# Patient Record
Sex: FEMALE | Race: WHITE | Employment: OTHER | URBAN - METROPOLITAN AREA
[De-identification: names, ages, dates, MRNs, and addresses within clinical notes are randomized per-mention and may not be internally consistent; named-entity substitution may affect disease eponyms.]

---

## 2023-11-05 ENCOUNTER — HOSPITAL ENCOUNTER (OUTPATIENT)
Dept: MRI IMAGING | Facility: HOSPITAL | Age: 63
Discharge: HOME | End: 2023-11-05
Attending: FAMILY MEDICINE
Payer: MEDICARE

## 2023-11-05 ENCOUNTER — HOSPITAL ENCOUNTER (OUTPATIENT)
Dept: MRI IMAGING | Facility: HOSPITAL | Age: 63
End: 2023-11-05
Attending: FAMILY MEDICINE
Payer: MEDICARE

## 2023-11-05 DIAGNOSIS — Z87.898 HISTORY OF SEIZURE: ICD-10-CM

## 2023-11-05 DIAGNOSIS — R42 DIZZINESS AND GIDDINESS: ICD-10-CM

## 2023-11-05 PROCEDURE — 70553 MRI BRAIN STEM W/O & W/DYE: CPT | Performed by: FAMILY MEDICINE

## 2023-11-05 PROCEDURE — A9575 INJ GADOTERATE MEGLUMI 0.1ML: HCPCS | Performed by: FAMILY MEDICINE

## 2023-11-05 RX ORDER — GADOTERATE MEGLUMINE 376.9 MG/ML
20 INJECTION INTRAVENOUS
Status: COMPLETED | OUTPATIENT
Start: 2023-11-05 | End: 2023-11-05

## 2023-11-05 RX ADMIN — GADOTERATE MEGLUMINE 18 ML: 376.9 INJECTION INTRAVENOUS at 09:09:00

## 2023-11-07 ENCOUNTER — NURSE ONLY (OUTPATIENT)
Dept: ELECTROPHYSIOLOGY | Facility: HOSPITAL | Age: 63
End: 2023-11-07
Attending: FAMILY MEDICINE
Payer: MEDICARE

## 2023-11-07 DIAGNOSIS — R42 DIZZINESS AND GIDDINESS: ICD-10-CM

## 2023-11-07 DIAGNOSIS — Z87.898 HISTORY OF SEIZURE: ICD-10-CM

## 2023-11-07 PROCEDURE — 95816 EEG AWAKE AND DROWSY: CPT | Performed by: OTHER

## 2023-11-08 NOTE — PROCEDURES
ELECTROENCEPHALOGRAM REPORT      Patient Name: Lavinia Boeck   : 1960    Date of Test: 2023   History: 58year old female with history of seizures, with instances of dizziness    TECHNICAL ASPECTS OF EEG RECORDING:  This is a scalp EEG monitoring study. Scalp electrodes were positioned according to the 10-20 International system of electrode placement. EEG data were recorded continuously and digitally stored, and this is a routine study. No sedation was given. EEG data were reviewed using bipolar and referential montages. Video recording is also present. DESCRIPTION OF EEG FINDINGS:  BACKGROUND ACTIVITY: The background rhythm consists of well organized, medium amplitude 10 Hertz waves, which are symmetrical. They do attenuate with eye opening. INTERICTAL EPILEPTIFORM ACTIVITY: None  ICTAL ACTIVITY: None  ACTIVATION PROCEDURES: Photic stimulation produced no epileptiform activity. SLEEP: Sleep was not achieved    IMPRESSION:  This is a normal, awake and drowsy electroencephalogram.  Sleep was not achieved. There is no focal abnormality or epileptiform activity seen in this record.       Eugenio Costa DO  Neuromuscular and General Neurology  [facility]

## 2024-01-06 ENCOUNTER — APPOINTMENT (OUTPATIENT)
Dept: GENERAL RADIOLOGY | Facility: HOSPITAL | Age: 64
End: 2024-01-06
Attending: EMERGENCY MEDICINE
Payer: MEDICARE

## 2024-01-06 ENCOUNTER — HOSPITAL ENCOUNTER (EMERGENCY)
Facility: HOSPITAL | Age: 64
Discharge: HOME | End: 2024-01-06
Attending: EMERGENCY MEDICINE
Payer: MEDICARE

## 2024-01-06 ENCOUNTER — APPOINTMENT (OUTPATIENT)
Dept: MRI IMAGING | Facility: HOSPITAL | Age: 64
End: 2024-01-06
Attending: EMERGENCY MEDICINE
Payer: MEDICARE

## 2024-01-06 VITALS
WEIGHT: 190 LBS | DIASTOLIC BLOOD PRESSURE: 71 MMHG | SYSTOLIC BLOOD PRESSURE: 122 MMHG | TEMPERATURE: 98 F | RESPIRATION RATE: 12 BRPM | BODY MASS INDEX: 33.66 KG/M2 | OXYGEN SATURATION: 100 % | HEART RATE: 69 BPM | HEIGHT: 63 IN

## 2024-01-06 DIAGNOSIS — G40.909 SEIZURE DISORDER (HCC): Primary | ICD-10-CM

## 2024-01-06 DIAGNOSIS — R56.9 SEIZURE (HCC): ICD-10-CM

## 2024-01-06 LAB
ALBUMIN SERPL-MCNC: 3.8 G/DL (ref 3.4–5)
ALBUMIN/GLOB SERPL: 1 {RATIO} (ref 1–2)
ALP LIVER SERPL-CCNC: 105 U/L
ALT SERPL-CCNC: 15 U/L
ANION GAP SERPL CALC-SCNC: 7 MMOL/L (ref 0–18)
AST SERPL-CCNC: 9 U/L (ref 15–37)
ATRIAL RATE: 78 BPM
BASOPHILS # BLD AUTO: 0.05 X10(3) UL (ref 0–0.2)
BASOPHILS NFR BLD AUTO: 0.8 %
BILIRUB SERPL-MCNC: 0.3 MG/DL (ref 0.1–2)
BUN BLD-MCNC: 12 MG/DL (ref 9–23)
CALCIUM BLD-MCNC: 8.8 MG/DL (ref 8.5–10.1)
CHLORIDE SERPL-SCNC: 111 MMOL/L (ref 98–112)
CO2 SERPL-SCNC: 23 MMOL/L (ref 21–32)
CREAT BLD-MCNC: 1.25 MG/DL
EGFRCR SERPLBLD CKD-EPI 2021: 48 ML/MIN/1.73M2 (ref 60–?)
EOSINOPHIL # BLD AUTO: 0.01 X10(3) UL (ref 0–0.7)
EOSINOPHIL NFR BLD AUTO: 0.2 %
ERYTHROCYTE [DISTWIDTH] IN BLOOD BY AUTOMATED COUNT: 12.2 %
GLOBULIN PLAS-MCNC: 3.8 G/DL (ref 2.8–4.4)
GLUCOSE BLD-MCNC: 143 MG/DL (ref 70–99)
HCT VFR BLD AUTO: 43.1 %
HGB BLD-MCNC: 15 G/DL
IMM GRANULOCYTES # BLD AUTO: 0.01 X10(3) UL (ref 0–1)
IMM GRANULOCYTES NFR BLD: 0.2 %
LYMPHOCYTES # BLD AUTO: 1.89 X10(3) UL (ref 1–4)
LYMPHOCYTES NFR BLD AUTO: 29.4 %
MCH RBC QN AUTO: 28.7 PG (ref 26–34)
MCHC RBC AUTO-ENTMCNC: 34.8 G/DL (ref 31–37)
MCV RBC AUTO: 82.4 FL
MONOCYTES # BLD AUTO: 0.39 X10(3) UL (ref 0.1–1)
MONOCYTES NFR BLD AUTO: 6.1 %
NEUTROPHILS # BLD AUTO: 4.07 X10 (3) UL (ref 1.5–7.7)
NEUTROPHILS # BLD AUTO: 4.07 X10(3) UL (ref 1.5–7.7)
NEUTROPHILS NFR BLD AUTO: 63.3 %
OSMOLALITY SERPL CALC.SUM OF ELEC: 294 MOSM/KG (ref 275–295)
P AXIS: 50 DEGREES
P-R INTERVAL: 162 MS
PLATELET # BLD AUTO: 433 10(3)UL (ref 150–450)
POTASSIUM SERPL-SCNC: 4 MMOL/L (ref 3.5–5.1)
PROT SERPL-MCNC: 7.6 G/DL (ref 6.4–8.2)
Q-T INTERVAL: 400 MS
QRS DURATION: 100 MS
QTC CALCULATION (BEZET): 456 MS
R AXIS: -2 DEGREES
RBC # BLD AUTO: 5.23 X10(6)UL
SODIUM SERPL-SCNC: 141 MMOL/L (ref 136–145)
T AXIS: 39 DEGREES
TROPONIN I SERPL HS-MCNC: 5 NG/L
VENTRICULAR RATE: 78 BPM
WBC # BLD AUTO: 6.4 X10(3) UL (ref 4–11)

## 2024-01-06 PROCEDURE — 85025 COMPLETE CBC W/AUTO DIFF WBC: CPT | Performed by: EMERGENCY MEDICINE

## 2024-01-06 PROCEDURE — 93005 ELECTROCARDIOGRAM TRACING: CPT

## 2024-01-06 PROCEDURE — 99285 EMERGENCY DEPT VISIT HI MDM: CPT

## 2024-01-06 PROCEDURE — 80053 COMPREHEN METABOLIC PANEL: CPT | Performed by: EMERGENCY MEDICINE

## 2024-01-06 PROCEDURE — 70551 MRI BRAIN STEM W/O DYE: CPT | Performed by: EMERGENCY MEDICINE

## 2024-01-06 PROCEDURE — 96360 HYDRATION IV INFUSION INIT: CPT

## 2024-01-06 PROCEDURE — 84484 ASSAY OF TROPONIN QUANT: CPT | Performed by: EMERGENCY MEDICINE

## 2024-01-06 PROCEDURE — 71045 X-RAY EXAM CHEST 1 VIEW: CPT | Performed by: EMERGENCY MEDICINE

## 2024-01-06 RX ORDER — TOPIRAMATE 50 MG/1
50 TABLET, FILM COATED ORAL NIGHTLY
COMMUNITY
Start: 2023-10-27

## 2024-01-06 RX ORDER — MECLIZINE HYDROCHLORIDE 25 MG/1
25 TABLET ORAL ONCE
Status: COMPLETED | OUTPATIENT
Start: 2024-01-06 | End: 2024-01-06

## 2024-01-06 RX ORDER — CLONAZEPAM 0.5 MG/1
0.5 TABLET ORAL DAILY
Qty: 30 TABLET | Refills: 0 | Status: SHIPPED | OUTPATIENT
Start: 2024-01-06

## 2024-01-06 RX ORDER — AMOXICILLIN 500 MG/1
500 TABLET, FILM COATED ORAL 3 TIMES DAILY
COMMUNITY
Start: 2023-09-13

## 2024-01-06 NOTE — ED QUICK NOTES
SPOKE WITH MRI, ETA: 1430.   Alert and oriented, no focal deficits, no motor or sensory deficits. 5/5 strength in UE/LE b/l. CN II-XII intact.

## 2024-01-06 NOTE — DISCHARGE INSTRUCTIONS
Increase your total dose to 500 mg you can take an extra half a tablet during the evening.  Follow-up with your primary MD, neurologist take 1 baby aspirin daily.  With medications or with your seizure until seen by neurologist.    You were seen in the emergency room in a limited time.  There is a possibility that although we do not see any acute process at this present time that things can change with time.  Is therefore imperative that you follow-up with primary care physician for close follow-up.  If there is any significant progression of your pain  or other symptoms you to return immediately to the emergency room.

## 2024-01-06 NOTE — ED QUICK NOTES
Assumed care for patient. Patient returned from MRI and placed on cardiac monitoring. Pure wick is intact. Patient is calm and in no distress

## 2024-01-06 NOTE — ED INITIAL ASSESSMENT (HPI)
Hx of seizures. Pt is currently A/Ox4 states she is currently having an ongoing seizure for the past 30minutes. Pt reports arms and legs feel weak and spasming. Speech clear, no facial droop.

## 2024-01-06 NOTE — ED PROVIDER NOTES
Patient Seen in: [facility] Emergency Department      History     Chief Complaint   Patient presents with    Seizure Disorder     Stated Complaint: Seizures    Subjective:   HPI    This is a 63-year-old female who has had a previous history of grand mal seizures.  P.  In 2009 she had 3 grand mal seizures and since then she said she has not had any grand mal seizures.  She has these episodes where she feels like she has flailing of her right leg and then gets weak usually last only a few seconds to minutes.  She has seen a neurologist before.  Has been on Lamictal 400 mg daily.  The patient states that she has not missed any doses.  Today she was laying on the couch and then she stated that her right leg and her left leg was flailing.  And moving in different directions.  Although she was awake during the event.  She never passed out.  She states she has had a little bit of hard time peaking sometimes with these events.  She states that he saw a neurologist before for this.  The patient states that she is never passes out or loses consciousness.  But she had a hard time getting up and getting walking around because she felt unsteady on her feet.  She is does have a history of dementia, seizure disorder.  She is on Lamictal, Effexor, Synthroid,.  Present she has no numbness or weakness regarding the nurses had to have a hard time getting her up.  Out of the wheelchair.  Presently has no complaints of headache trouble speaking, numbness or weakness.  Objective:   Past Medical History:   Diagnosis Date    Dementia (HCC)     Depression     Endocrine disorder     enlarged thyroid - removed 1990'    Seizure disorder (HCC)     Seizure disorder, grand mal (HCC)     none for 20 years, then 3 grandmal in 2009    Sleep apnea     Unspecified disorder of thyroid               Past Surgical History:   Procedure Laterality Date    KNEE ARTHROSCOPY      THYROIDECTOMY                  Social History     Socioeconomic History     Marital status:    Tobacco Use    Smoking status: Former     Packs/day: 0.75     Years: 10.00     Additional pack years: 0.00     Total pack years: 7.50     Types: Cigarettes     Quit date: 2005     Years since quittin.0    Smokeless tobacco: Never   Substance and Sexual Activity    Alcohol use: No    Drug use: No              Review of Systems    Positive for stated complaint: Seizures  Other systems are as noted in HPI.  Constitutional and vital signs reviewed.      All other systems reviewed and negative except as noted above.    Physical Exam     ED Triage Vitals [24 1236]   BP (!) 142/99   Pulse 87   Resp 18   Temp 98.2 °F (36.8 °C)   Temp src Temporal   SpO2 97 %   O2 Device None (Room air)       Current:/88   Pulse 70   Temp 98.2 °F (36.8 °C) (Temporal)   Resp 12   Ht 160 cm (5' 3\")   Wt 86.2 kg   SpO2 100%   BMI 33.66 kg/m²         Physical Exam  General: .  Female no respiratory distress she is here with her .   The patient is in no respiratory distress. The patient is not septic or toxic    HEENT: Atraumatic, conjunctiva are not pale.  There is no icterus.  Oral mucosa Is wet. The neck is supple. There is no meningismus.  There is no facial asymmetry.    LUNGS: Clear to auscultation, there is no wheezing or retraction.  No crackles.    CV: Cardiovascular is regular without murmurs or rubs.    ABD: The abdomen is soft nondistended nontender.  There is no rebound.  There is no guarding.  Bowel sounds are present.    EXT: There is good pulses bilaterally.  There is no calf tenderness.  There is no rash noted.  There is no edema    NEURO: Alert and oriented x3.      Cranial nerves are grossly intact.      Pupils are equal and reactive to light     Extraocular muscles are intact.      Muscle strength is 5 out of 5.    Sensory exam is grossly normal bilaterally upper and lower extremity    There is no pronator drift.    There is normal finger to nose  bilaterally.             ED Course     Labs Reviewed   COMP METABOLIC PANEL (14) - Abnormal; Notable for the following components:       Result Value    Glucose 143 (*)     Creatinine 1.25 (*)     eGFR-Cr 48 (*)     AST 9 (*)     All other components within normal limits   TROPONIN I HIGH SENSITIVITY - Normal   CBC WITH DIFFERENTIAL WITH PLATELET    Narrative:     The following orders were created for panel order CBC With Differential With Platelet.  Procedure                               Abnormality         Status                     ---------                               -----------         ------                     CBC W/ DIFFERENTIAL[[id number]]                              Final result                 Please view results for these tests on the individual orders.   RAINBOW DRAW LAVENDER   RAINBOW DRAW LIGHT GREEN   RAINBOW DRAW BLUE   RAINBOW DRAW GOLD   CBC W/ DIFFERENTIAL               The patient was placed on monitors, IV was started, blood was drawn.     Patient's NIH score is 0 at this present time she does not meet criteria for tPA neurologically she looks well at this present time.  But her story is concerning for not a grand mal seizure but some kind of movement disorder but the fact that she had some trouble speaking and also difficulty walking concerning for possible TIA, CVA.  Will do an MRI DWI.  To rule out any acute stroke, electrolyte imbalance.         MDM      The EKG shows normal sinus rhythm.  There is no acute ST elevations or ischemic findings.  The rest of the EKG including rate rhythm axis and intervals I agree with the EKG report . The rate is 78 there is findings of an old inferior infarct possible nonspecific ST changes.  When compared to an old EKG there is no significant changes incomplete right bundle branch was seen previously.          I personally reviewed the radiographs and my individual interpretation shows    No obvious intracranial mass or bleed.    Also reviewed official  report and it shows      MRI STROKE BRAIN DWI ONLY(NO IV)(CPT=70551)    Result Date: 1/6/2024  PROCEDURE:  MRI STROKE BRAIN DWI ONLY(NO IV)(CPT=70551)  COMPARISON:  [facility] , MR, MRI BRAIN (W+WO) (CPT=70553), 11/05/2023, 8:21 AM.  [facility] , MR, MRI BRAIN(W+WO)/MRA BRAIN (CPT=70553/80303), 1/12/2016, 6:17 PM.  INDICATIONS:  Seizures  TECHNIQUE:  MRI of the brain was performed without intravenous gadolinium contrast using only diffusion image sequences in the axial plane. All measurements obtained in this exam were performed using NASCET criteria.  PATIENT STATED HISTORY: (As transcribed by Technologist)  Patient has history of seizures.    FINDINGS: MRI BRAIN No evidence for acute infarct.            CONCLUSION:  Limited MR imaging.  No bright diffusion signal to suggest acute infarct.   LOCATION:  [facility]   Dictated by (CST): Annie Payton MD on 1/06/2024 at 3:25 PM     Finalized by (CST): Annie Payton MD on 1/06/2024 at 3:26 PM       XR CHEST AP PORTABLE  (CPT=71045)    Result Date: 1/6/2024  PROCEDURE:  XR CHEST AP PORTABLE  (CPT=71045)  TECHNIQUE:  AP chest radiograph was obtained.  COMPARISON:  [facility] , XR, XR CHEST AP PORTABLE  (CPT=71010), 1/12/2016, 1:30 PM.  [facility] , XR, RIBS WITH CXR (3 VIEWS), LEFT, 9/15/2012, 10:37 AM.  INDICATIONS:  Seizures  PATIENT STATED HISTORY: (As transcribed by Technologist)  Patient stated she had a siezure that lasted 30 minutes this morning.                 CONCLUSION:  Normal examination.    LOCATION:  [facility]      Dictated by (CST): Rui Griffith MD on 1/06/2024 at 1:44 PM     Finalized by (CST): Rui Griffith MD on 1/06/2024 at 1:45 PM             The patient's CBC was grossly negative  Comprehensive troponin was grossly negative.  I did go back and reexamined the patient she has a normal gait.  She has no focal findings.  Neurologically intact.  He has a normal gait no focal findings.  I talked to her at length it me it sounded like she is been on 400 mg of  Lamictal.  I did I did talk to her pharmacist who states that she can go up and to 500 mg.  I discussed that take an extra half a tablet of the 200 mg.  I have also she did state that she was on Klonopin but she is off of it I discussed we can give her a short course of Klonopin she is got a scheduled appointment to see the neurologist later this month.  I discussed with her it does sound to be a partial seizure but I discussed with her that she is just to follow-up with her doctor she is neurologically intact with NIH score of 0 I recommend also baby aspirin.    I discussed with the patient that were seeing them  in a short period of time.  I discussed with them that there is always a possibility that things can change and a need reevaluation with their primary care physician as soon as possible.  I've also discussed with them that if the pain gets worse to return to the emergency room immediately.        This note was prepared using Dragon Medical voice recognition dictation software. As a result errors may occur. When identified these errors have been corrected. While every attempt is made to correct errors during dictation discrepancies may still exist.  If there is any questions contact the dictating provider for further clarification    Medical Decision Making      Disposition and Plan     Clinical Impression:  1. Seizure disorder (HCC)    2. Seizure (HCC)         Disposition:  Discharge  1/6/2024  3:45 pm    Follow-up:  Madi Luz MD  [address] [place] [id number]  [phone]    Follow up in 2 day(s)      Umu Rowell MD  [address]   [facility] [address]  [phone]    Follow up in 2 day(s)            Medications Prescribed:  Current Discharge Medication List        START taking these medications    Details   !! clonazePAM 0.5 MG Oral Tab Take 1 tablet (0.5 mg total) by mouth daily.  Qty: 30 tablet, Refills: 0    Associated Diagnoses: Seizure (HCC)       !! - Potential duplicate  medications found. Please discuss with provider.

## 2024-01-07 ENCOUNTER — HOSPITAL ENCOUNTER (EMERGENCY)
Facility: HOSPITAL | Age: 64
Discharge: HOME | End: 2024-01-07
Attending: STUDENT IN AN ORGANIZED HEALTH CARE EDUCATION/TRAINING PROGRAM
Payer: MEDICARE

## 2024-01-07 VITALS
HEIGHT: 63 IN | HEART RATE: 63 BPM | RESPIRATION RATE: 16 BRPM | BODY MASS INDEX: 31.89 KG/M2 | TEMPERATURE: 98 F | SYSTOLIC BLOOD PRESSURE: 125 MMHG | DIASTOLIC BLOOD PRESSURE: 73 MMHG | WEIGHT: 180 LBS | OXYGEN SATURATION: 98 %

## 2024-01-07 DIAGNOSIS — R56.9 SEIZURE-LIKE ACTIVITY (HCC): Primary | ICD-10-CM

## 2024-01-07 PROCEDURE — 99284 EMERGENCY DEPT VISIT MOD MDM: CPT

## 2024-01-07 PROCEDURE — 99283 EMERGENCY DEPT VISIT LOW MDM: CPT

## 2024-01-07 RX ORDER — LEVETIRACETAM 500 MG/1
500 TABLET ORAL 2 TIMES DAILY
Qty: 60 TABLET | Refills: 0 | Status: SHIPPED | OUTPATIENT
Start: 2024-01-07 | End: 2024-02-06

## 2024-01-07 NOTE — ED PROVIDER NOTES
History     Chief Complaint   Patient presents with    Seizure Disorder       HPI    63 year old female with history of depression, dementia, seizures well-controlled on Lamictal presents for evaluation.  Patient states over the past few weeks she has been having \"episodes\" as described by her neurologist where she does not feel right, at times she feels like her vision changes and she feels generally weak in her limbs, sometimes she feels that she cannot speak, other times she is awake and can speak during it.  She had 1 today which lasted about 10 seconds, she felt dizzy and felt like all of her limbs feel very weak,  states that she was able to speak during the episode, she does not lose consciousness and patient remembers these events.  She now feels like she is back to her baseline.  Has been in a state of good health recently otherwise.  No recent illness or injuries.  She was seen in the ER yesterday for similar episode, workup yesterday was unrevealing.          Past Medical History:   Diagnosis Date    Dementia (HCC)     Depression     Endocrine disorder     enlarged thyroid - removed     Seizure disorder (HCC)     Seizure disorder, grand mal (formerly [facility])     none for 20 years, then 3 grandmal in     Sleep apnea     Unspecified disorder of thyroid        Past Surgical History:   Procedure Laterality Date    KNEE ARTHROSCOPY      THYROIDECTOMY         Social History     Socioeconomic History    Marital status:    Tobacco Use    Smoking status: Former     Packs/day: 0.75     Years: 10.00     Additional pack years: 0.00     Total pack years: 7.50     Types: Cigarettes     Quit date: 2005     Years since quittin.0    Smokeless tobacco: Never   Substance and Sexual Activity    Alcohol use: No    Drug use: No                   Physical Exam     ED Triage Vitals [24 1154]   BP (!) 163/85   Pulse 85   Resp 16   Temp 98.2 °F (36.8 °C)   Temp src Temporal   SpO2 99 %   O2 Device  None (Room air)       Physical Exam  Constitutional:       General: She is not in acute distress.  Eyes:      Extraocular Movements: Extraocular movements intact.   Cardiovascular:      Rate and Rhythm: Normal rate and regular rhythm.      Pulses: Normal pulses.   Pulmonary:      Effort: Pulmonary effort is normal. No respiratory distress.      Breath sounds: Normal breath sounds.   Musculoskeletal:      Cervical back: Normal range of motion.   Neurological:      General: No focal deficit present.      Mental Status: She is alert and oriented to person, place, and time.      Cranial Nerves: No cranial nerve deficit.      Sensory: No sensory deficit.      Motor: No weakness.      Coordination: Coordination normal.              ED Course     Labs Reviewed - No data to display  MRI STROKE BRAIN DWI ONLY(NO IV)(CPT=70551)    Result Date: 1/6/2024  CONCLUSION:  Limited MR imaging.  No bright diffusion signal to suggest acute infarct.   LOCATION:  [facility]   Dictated by (CST): Annie Payton MD on 1/06/2024 at 3:25 PM     Finalized by (CST): Annie Payton MD on 1/06/2024 at 3:26 PM            MDM     Vitals:    01/07/24 1154 01/07/24 1200 01/07/24 1201 01/07/24 1230   BP: (!) 163/85 136/87  125/73   Pulse: 85 70  63   Resp: 16 16  16   Temp: 98.2 °F (36.8 °C)      TempSrc: Temporal      SpO2: 99% 99%  98%   Weight:   81.6 kg    Height:   160 cm (5' 3\")        Nonspecific episode does not sound consistent with seizure activity, patient's prior seizures were grand mal and this is quite atypical.  Nonfocal but generalized symptoms not consistent with any kind of generalized seizure activity.  Reviewed MRI yesterday which did not show any ischemic changes.  Lab work was unrevealing.  Patient's neurologic exam now is normal.    I consulted with neurology, given repeat symptoms recommend starting patient on Keppra prophylactically and can follow-up in clinic.  I discussed these recommendations with patient and  at  bedside, they remained hemodynamically stable and comfortable and are comfortable with this plan.  Discharged stable condition, return precautions.  Patient will call her neurologist tomorrow morning for follow-up appointment this week.         Disposition and Plan     Clinical Impression:  1. Seizure-like activity (HCC)        Disposition:  Discharge    Follow-up:  Umu Rowell MD  [address] [place] [address]  [phone]    Follow up        Medications Prescribed:  Current Discharge Medication List        START taking these medications    Details   levETIRAcetam 500 MG Oral Tab Take 1 tablet (500 mg total) by mouth 2 (two) times daily.  Qty: 60 tablet, Refills: 0

## 2024-01-07 NOTE — ED INITIAL ASSESSMENT (HPI)
Pt to ED via walk in c/o seizure. Pt reports she started seeing double and felt dizzy when sitting on couch, reports episode lasted for about 10 seconds. Hx seizures, takes medication. Pt reports feeling dizzy at this time.

## 2024-02-06 ENCOUNTER — HOSPITAL ENCOUNTER (OUTPATIENT)
Dept: MAMMOGRAPHY | Facility: HOSPITAL | Age: 64
Discharge: HOME | End: 2024-02-06
Attending: FAMILY MEDICINE
Payer: MEDICARE

## 2024-02-06 DIAGNOSIS — Z12.31 SCREENING MAMMOGRAM FOR BREAST CANCER: ICD-10-CM

## 2024-02-06 PROCEDURE — 77067 SCR MAMMO BI INCL CAD: CPT | Performed by: FAMILY MEDICINE

## 2024-02-06 PROCEDURE — 77063 BREAST TOMOSYNTHESIS BI: CPT | Performed by: FAMILY MEDICINE

## 2024-07-08 ENCOUNTER — HOSPITAL ENCOUNTER (EMERGENCY)
Facility: HOSPITAL | Age: 64
Discharge: HOME | End: 2024-07-08
Attending: EMERGENCY MEDICINE
Payer: MEDICARE

## 2024-07-08 ENCOUNTER — APPOINTMENT (OUTPATIENT)
Dept: MRI IMAGING | Facility: HOSPITAL | Age: 64
End: 2024-07-08
Attending: EMERGENCY MEDICINE
Payer: MEDICARE

## 2024-07-08 VITALS
DIASTOLIC BLOOD PRESSURE: 71 MMHG | HEART RATE: 79 BPM | HEIGHT: 63 IN | RESPIRATION RATE: 16 BRPM | SYSTOLIC BLOOD PRESSURE: 115 MMHG | WEIGHT: 170 LBS | BODY MASS INDEX: 30.12 KG/M2 | OXYGEN SATURATION: 99 % | TEMPERATURE: 98 F

## 2024-07-08 DIAGNOSIS — R47.81 SLURRED SPEECH: Primary | ICD-10-CM

## 2024-07-08 DIAGNOSIS — R53.1 WEAKNESS GENERALIZED: ICD-10-CM

## 2024-07-08 LAB
ALBUMIN SERPL-MCNC: 3.6 G/DL (ref 3.4–5)
ALBUMIN/GLOB SERPL: 0.9 {RATIO} (ref 1–2)
ALP LIVER SERPL-CCNC: 109 U/L
ALT SERPL-CCNC: 17 U/L
ANION GAP SERPL CALC-SCNC: 6 MMOL/L (ref 0–18)
AST SERPL-CCNC: 10 U/L (ref 15–37)
ATRIAL RATE: 67 BPM
BASOPHILS # BLD AUTO: 0.03 X10(3) UL (ref 0–0.2)
BASOPHILS NFR BLD AUTO: 0.5 %
BILIRUB SERPL-MCNC: 0.4 MG/DL (ref 0.1–2)
BILIRUB UR QL STRIP.AUTO: NEGATIVE
BUN BLD-MCNC: 10 MG/DL (ref 9–23)
CALCIUM BLD-MCNC: 9.1 MG/DL (ref 8.5–10.1)
CHLORIDE SERPL-SCNC: 110 MMOL/L (ref 98–112)
CLARITY UR REFRACT.AUTO: CLEAR
CO2 SERPL-SCNC: 25 MMOL/L (ref 21–32)
COLOR UR AUTO: COLORLESS
CREAT BLD-MCNC: 1.07 MG/DL
EGFRCR SERPLBLD CKD-EPI 2021: 58 ML/MIN/1.73M2 (ref 60–?)
EOSINOPHIL # BLD AUTO: 0 X10(3) UL (ref 0–0.7)
EOSINOPHIL NFR BLD AUTO: 0 %
ERYTHROCYTE [DISTWIDTH] IN BLOOD BY AUTOMATED COUNT: 12.4 %
GLOBULIN PLAS-MCNC: 3.9 G/DL (ref 2.8–4.4)
GLUCOSE BLD-MCNC: 112 MG/DL (ref 70–99)
GLUCOSE UR STRIP.AUTO-MCNC: NORMAL MG/DL
HCT VFR BLD AUTO: 41.4 %
HGB BLD-MCNC: 14.6 G/DL
IMM GRANULOCYTES # BLD AUTO: 0.01 X10(3) UL (ref 0–1)
IMM GRANULOCYTES NFR BLD: 0.2 %
KETONES UR STRIP.AUTO-MCNC: NEGATIVE MG/DL
LEUKOCYTE ESTERASE UR QL STRIP.AUTO: NEGATIVE
LYMPHOCYTES # BLD AUTO: 1.48 X10(3) UL (ref 1–4)
LYMPHOCYTES NFR BLD AUTO: 23.6 %
MCH RBC QN AUTO: 28.6 PG (ref 26–34)
MCHC RBC AUTO-ENTMCNC: 35.3 G/DL (ref 31–37)
MCV RBC AUTO: 81.2 FL
MONOCYTES # BLD AUTO: 0.37 X10(3) UL (ref 0.1–1)
MONOCYTES NFR BLD AUTO: 5.9 %
NEUTROPHILS # BLD AUTO: 4.39 X10 (3) UL (ref 1.5–7.7)
NEUTROPHILS # BLD AUTO: 4.39 X10(3) UL (ref 1.5–7.7)
NEUTROPHILS NFR BLD AUTO: 69.8 %
NITRITE UR QL STRIP.AUTO: NEGATIVE
OSMOLALITY SERPL CALC.SUM OF ELEC: 292 MOSM/KG (ref 275–295)
P AXIS: 45 DEGREES
P-R INTERVAL: 174 MS
PH UR STRIP.AUTO: 7 [PH] (ref 5–8)
PLATELET # BLD AUTO: 364 10(3)UL (ref 150–450)
POTASSIUM SERPL-SCNC: 4.1 MMOL/L (ref 3.5–5.1)
PROT SERPL-MCNC: 7.5 G/DL (ref 6.4–8.2)
PROT UR STRIP.AUTO-MCNC: NEGATIVE MG/DL
Q-T INTERVAL: 398 MS
QRS DURATION: 92 MS
QTC CALCULATION (BEZET): 420 MS
R AXIS: 23 DEGREES
RBC # BLD AUTO: 5.1 X10(6)UL
RBC UR QL AUTO: NEGATIVE
SODIUM SERPL-SCNC: 141 MMOL/L (ref 136–145)
SP GR UR STRIP.AUTO: <1.005 (ref 1–1.03)
T AXIS: 37 DEGREES
T3FREE SERPL-MCNC: 2.6 PG/ML (ref 2.4–4.2)
T4 FREE SERPL-MCNC: 1.7 NG/DL (ref 0.8–1.7)
TSI SER-ACNC: 0.09 MIU/ML (ref 0.36–3.74)
UROBILINOGEN UR STRIP.AUTO-MCNC: NORMAL MG/DL
VENTRICULAR RATE: 67 BPM
WBC # BLD AUTO: 6.3 X10(3) UL (ref 4–11)

## 2024-07-08 PROCEDURE — 85025 COMPLETE CBC W/AUTO DIFF WBC: CPT | Performed by: EMERGENCY MEDICINE

## 2024-07-08 PROCEDURE — 99285 EMERGENCY DEPT VISIT HI MDM: CPT

## 2024-07-08 PROCEDURE — 84481 FREE ASSAY (FT-3): CPT | Performed by: EMERGENCY MEDICINE

## 2024-07-08 PROCEDURE — 80175 DRUG SCREEN QUAN LAMOTRIGINE: CPT | Performed by: EMERGENCY MEDICINE

## 2024-07-08 PROCEDURE — 84443 ASSAY THYROID STIM HORMONE: CPT | Performed by: EMERGENCY MEDICINE

## 2024-07-08 PROCEDURE — 36415 COLL VENOUS BLD VENIPUNCTURE: CPT

## 2024-07-08 PROCEDURE — 93005 ELECTROCARDIOGRAM TRACING: CPT

## 2024-07-08 PROCEDURE — 93010 ELECTROCARDIOGRAM REPORT: CPT

## 2024-07-08 PROCEDURE — 70551 MRI BRAIN STEM W/O DYE: CPT | Performed by: EMERGENCY MEDICINE

## 2024-07-08 PROCEDURE — 81003 URINALYSIS AUTO W/O SCOPE: CPT | Performed by: EMERGENCY MEDICINE

## 2024-07-08 PROCEDURE — 80053 COMPREHEN METABOLIC PANEL: CPT | Performed by: EMERGENCY MEDICINE

## 2024-07-08 PROCEDURE — 84439 ASSAY OF FREE THYROXINE: CPT | Performed by: EMERGENCY MEDICINE

## 2024-07-08 NOTE — DISCHARGE INSTRUCTIONS
Follow-up with your own urologist.  And have your levels checked as an outpatient as it will not come back right away.  Also follow-up with your primary care physician take 1 baby aspirin daily.    You were seen in the emergency room in a limited time.  There is a possibility that although we do not see any acute process at this present time that things can change with time.  Is therefore imperative that you follow-up with primary care physician for close follow-up.  If there is any significant progression of your pain  or other symptoms you to return immediately to the emergency room.

## 2024-07-08 NOTE — ED INITIAL ASSESSMENT (HPI)
Patient to ED after having an \"atypical seizure\" - patient and family state that the seizure is still happening as we speak. No seizure activity noted, patient speaking clearly, does not appear postictal, able to recall events clearly.

## 2024-07-08 NOTE — ED PROVIDER NOTES
Patient Seen in: [facility] Emergency Department      History     Chief Complaint   Patient presents with    Seizure Disorder     Stated Complaint: Seizure- 20 minutes ago, similar to previous seizures    Subjective:   HPI    This is a 64-year-old female who has a history of multiple different types of seizure disorders.  Including a grand mall.  She did have the last grand mal in 2009 she has what she describes atypical seizure..  Today for about 45 minutes the patient had what she described as an episode where she had weakness in both of her legs and the family states maybe have had some slurred speech this is typical of her when she has seizures.  She had been extensively worked up for seizure disorders including an MRI of her brain in 2024 in January.  The patient states that she has symptoms that last about 45 minutes of premature resolved by time I saw her.  She has no headache no numbness no weakness no trouble speaking.  She has no other specific complaints.  She said she has been on a higher dose of her medications  Including her Topamax which she states that she is now about 150 mg and she has not had any seizures for some time or several months since has been on the higher dose.  She has no trouble speaking no facial asymmetry the family stated that her there was a question of some facial droop on her left side which resolved by the time I saw her.  She does not have any slurred speech she does not have any weakness numbness no dizziness no chest pain no other complaints.    Objective:   Past Medical History:    Dementia (HCC)    Depression    Endocrine disorder    enlarged thyroid - removed 1990'    Seizure disorder (HCC)    Seizure disorder, grand mal (HCC)    none for 20 years, then 3 grandmal in 2009    Sleep apnea    Unspecified disorder of thyroid              Past Surgical History:   Procedure Laterality Date    Knee arthroscopy      Thyroidectomy                  Social History      Socioeconomic History    Marital status:    Tobacco Use    Smoking status: Former     Current packs/day: 0.00     Average packs/day: 0.8 packs/day for 10.0 years (7.5 ttl pk-yrs)     Types: Cigarettes     Start date: 1995     Quit date: 2005     Years since quittin.5    Smokeless tobacco: Never   Substance and Sexual Activity    Alcohol use: No    Drug use: No              Review of Systems    Positive for stated Chief Complaint: Seizure Disorder    Other systems are as noted in HPI.  Constitutional and vital signs reviewed.      All other systems reviewed and negative except as noted above.    Physical Exam     ED Triage Vitals [24 0953]   /75   Pulse 93   Resp 16   Temp 98 °F (36.7 °C)   Temp src    SpO2 97 %   O2 Device None (Room air)       Current Vitals:   Vital Signs  BP: 115/71  Pulse: 79  Resp: 16  Temp: 98 °F (36.7 °C)  MAP (mmHg): 84    Oxygen Therapy  SpO2: 99 %  O2 Device: None (Room air)            Physical Exam    General: .  Patient is in no respiratory distress.  Her cranial nerves are grossly intact pupils equal reactive extra muscles intact.  Pupils equal reactive.  The patient is in no respiratory distress    HEENT: Atraumatic, conjunctiva are not pale.  There is no icterus.  Oral mucosa Is wet.  No facial trauma.  The neck is supple.    LUNGS: Clear to auscultation, there is no wheezing or retraction.  No crackles.    CV: Cardiovascular is regular without murmurs or rubs.    ABD: The abdomen is soft nondistended nontender.  There is no rebound.  There is no guarding.    EXT: There is good pulses bilaterally.  There is no calf tenderness.  There is no rash noted.  There is no edema    NEURO: Alert and oriented x4.  Muscle strength and sensory exam is grossly normal.  And the patient is neurologically intact with no focal findings.  She has no pronator drift.  No facial asymmetry.  She has normal finger-nose.    ED Course     Labs Reviewed   COMP METABOLIC PANEL  (14) - Abnormal; Notable for the following components:       Result Value    Glucose 112 (*)     Creatinine 1.07 (*)     eGFR-Cr 58 (*)     AST 10 (*)     A/G Ratio 0.9 (*)     All other components within normal limits   URINALYSIS WITH CULTURE REFLEX - Abnormal; Notable for the following components:    Urine Color Colorless (*)     Spec Gravity <1.005 (*)     All other components within normal limits   TSH W REFLEX TO FREE T4 - Abnormal; Notable for the following components:    TSH 0.090 (*)     All other components within normal limits   T4, FREE (S) - Normal   FREE T3 (TRIIODOTHYRONINE) - Normal   CBC WITH DIFFERENTIAL WITH PLATELET    Narrative:     The following orders were created for panel order CBC With Differential With Platelet.  Procedure                               Abnormality         Status                     ---------                               -----------         ------                     CBC W/ DIFFERENTIAL[[id number]]                              Final result                 Please view results for these tests on the individual orders.   LAMOTRIGINE (LAMICTAL), SERUM   CBC W/ DIFFERENTIAL               The patient was placed on monitors, IV was started, blood was drawn.o         MDM      The EKG shows normal sinus rhythm.  There is no acute ST elevations or ischemic findings.  The rest of the EKG including rate rhythm axis and intervals I agree with the EKG report . The rate is 67 no acute ST elevation incomplete right bundle branch block.    When compared to an EKG from January 6, 2024 there is no significant changes.      The patient's TSH was slightly low but the T3-T4 were normal.  Comprehensive was normal urine did not show any signs of urinary tract infection CBC was normal.  Lamictal level was ordered.  Which I discussed with the patient will be sent out to the main lab it may take several days for it to come out back.  It is a send out.  MRI was done to rule out a mass, tumor, possible  stroke.          I personally reviewed the radiographs and my individual interpretation shows    MRI of the brain shows no obvious intracranial bleed, tumor.    Also reviewed official report and it shows  MRI BRAIN (CPT=70551)    Result Date: 7/8/2024  PROCEDURE:  MRI BRAIN (CPT=70551)  COMPARISON:  MR LARRY, MRI BRAIN (W+WO) (CPT=70553), 11/05/2023, 8:21 AM.  MR LARRY, MRI STROKE BRAIN DWI ONLY(NO IV)(CPT=70551), 1/06/2024, 2:53 PM.  INDICATIONS:  Seizure- 20 minutes ago, similar to previous seizures  TECHNIQUE:  MRI of the brain was performed with multi-planar T1, T2-weighted images with FLAIR sequences and diffusion weighted images without infusion.  PATIENT STATED HISTORY: (As transcribed by Technologist)  Patient states seizure today and last week.    FINDINGS:  INTRACRANIAL:  There are few small foci of T2/FLAIR hyperintensity in the periventricular white matter consistent small vessel ischemic disease.  Diffusion weighted imaging was performed and is unremarkable.  There is no evidence for acute infarction.  There is no evidence of hemorrhage or mass lesion.  VENTRICLES/SULCI:   Ventricles and sulci are normal in caliber.  There are no extra-axial fluid collections. There is no midline shift. SINUSES/ORBITS:       The visualized paranasal sinuses are clear.  The orbits are unremarkable. MASTOIDS:      The mastoids are clear.            CONCLUSION:  No acute intracranial process.   LOCATION:  [place]   Dictated by (CST): Lloyd Loving MD on 7/08/2024 at 1:46 PM     Finalized by (CST): Lloyd Loving MD on 7/08/2024 at 1:49 PM         The patient was reexamined the patient does not look ill or toxic.  Neurologically intact.  On repeat exam.  The patient and family I talked to the and reexamined her she is got no facial droop no slurred speech has no pronator drift her muscle strength is 5-5.  She states that she had seizures like this and she would present with similar symptoms I did it is an unusual  presentation and the other possibility could be that she could always had a TIA that cannot be seen on our own workup.  But I discussed with her you could also follow-up with our seizure clinic.  I have also discussed with her to take an aspirin daily she does take a baby aspirin discussed that increase it to 162.  I discussed with her the importance of close follow-up and any other complaints of numbness or weakness return here    Recommend close follow-up with her own neurologist that she sees Umu Rowell.  I discussed with her that would be reasonable to person to follow-up with.  I recommend she has increasing pain dizziness lightheadedness numbness or weakness return here she is neurologically intact and normal on repeat exam.    I discussed with the patient that were seeing them  in a short period of time.  I discussed with them that there is always a possibility that things can change and a need reevaluation with their primary care physician as soon as possible.  I've also discussed with them that if the pain gets worse to return to the emergency room immediately.      This note was prepared using Dragon [employer] voice recognition dictation software. As a result errors may occur. When identified these errors have been corrected. While every attempt is made to correct errors during dictation discrepancies may still exist.  If there is any questions contact the dictating provider for further clarification      Medical Decision Making      Disposition and Plan     Clinical Impression:  1. Slurred speech    2. Weakness generalized         Disposition:  Discharge  7/8/2024  2:13 pm    Follow-up:  [address] [place] [address] [place] [id number] [phone]  Call   An EEG and MRI have been ordered. Call office and choose option 1 for general neurology and state that you are following up for Seizure    Madi Luz MD  [address] [place] [address]  [address]  [place] [address]  [phone]    Follow up in 2 day(s)      Umu Rowell MD  [address] [place] [address]  [phone]    Follow up in 2 day(s)            Medications Prescribed:  Current Discharge Medication List

## 2024-07-10 LAB — LAMOTRIGINE LVL: 13.2 UG/ML

## 2024-11-19 ENCOUNTER — OFFICE VISIT (OUTPATIENT)
Facility: LOCATION | Age: 64
End: 2024-11-19
Payer: MEDICARE

## 2024-11-19 DIAGNOSIS — H90.3 SENSORINEURAL HEARING LOSS (SNHL) OF BOTH EARS: Primary | ICD-10-CM

## 2024-11-19 DIAGNOSIS — H93.13 TINNITUS OF BOTH EARS: ICD-10-CM

## 2024-11-19 DIAGNOSIS — H90.3 SENSORINEURAL HEARING LOSS, BILATERAL: Primary | ICD-10-CM

## 2024-11-19 PROCEDURE — 99204 OFFICE O/P NEW MOD 45 MIN: CPT | Performed by: OTOLARYNGOLOGY

## 2024-11-19 PROCEDURE — 92567 TYMPANOMETRY: CPT | Performed by: AUDIOLOGIST

## 2024-11-19 PROCEDURE — 92557 COMPREHENSIVE HEARING TEST: CPT | Performed by: AUDIOLOGIST

## 2024-11-19 NOTE — PROGRESS NOTES
Gary Howe was seen for an audiometric evaluation and tympanogram today. Referred back to physician.    Umu Henderson, AuD

## 2024-11-19 NOTE — PROGRESS NOTES
Gary Howe is a 64 year old female. No chief complaint on file.    HPI:   64-year-old white female main complaint is she has had bilateral constant tinnitus many years duration she has a seizure disorder is on Lamictal which seem to dampen the tinnitus.  Recently the seizures have gotten worse and her tinnitus is worse.  She does have decrease in hearing no otorrhea otalgia vertigo she worked in no noisy situations.  Current Outpatient Medications   Medication Sig Dispense Refill    topiramate 50 MG Oral Tab Take 1 tablet (50 mg total) by mouth nightly. TAKE AT BEDTIME      amoxicillin 500 MG Oral Tab Take 1 tablet (500 mg total) by mouth 3 (three) times daily.      clonazePAM 0.5 MG Oral Tab Take 1 tablet (0.5 mg total) by mouth daily. 30 tablet 0    cholecalciferol 1000 UNITS Oral Cap Take 1 capsule (1,000 Units total) by mouth daily.      Venlafaxine HCl ER (EFFEXOR-XR) 150 MG Oral Capsule SR 24 Hr Take 1 capsule (150 mg total) by mouth 2 (two) times daily. (Patient not taking: Reported on 1/6/2024) 60 capsule 0    clonazepam (KLONOPIN) 1 MG Oral Tab Take 1 mg by mouth 3 (three) times daily.        lamoTRIgine (LAMICTAL) 200 MG Oral Tab Take 2 tablets (400 mg total) by mouth daily. Patient takes 2 (200mg) tablets nightly      Donepezil HCl (ARICEPT) 10 MG Oral Tab Take 10 mg by mouth daily.        Vitamin B-12 (VITAMIN B12) 1000 MCG Oral Tab Take 1 tablet (1,000 mcg total) by mouth daily.      Levothyroxine Sodium (SYNTHROID) 100 MCG Oral Tab Take 1 tablet (100 mcg total) by mouth daily.        Past Medical History:    Dementia (HCC)    Depression    Endocrine disorder    enlarged thyroid - removed 1990'    Seizure disorder (HCC)    Seizure disorder, grand mal (HCC)    none for 20 years, then 3 grandmal in 2009    Sleep apnea    Unspecified disorder of thyroid      Social History:  Social History     Socioeconomic History    Marital status:    Tobacco Use    Smoking status: Former     Current  packs/day: 0.00     Average packs/day: 0.8 packs/day for 10.0 years (7.5 ttl pk-yrs)     Types: Cigarettes     Start date: 1995     Quit date: 2005     Years since quittin.8    Smokeless tobacco: Never   Substance and Sexual Activity    Alcohol use: No    Drug use: No      Past Surgical History:   Procedure Laterality Date    Knee arthroscopy      Thyroidectomy           REVIEW OF SYSTEMS:   GENERAL HEALTH: feels well otherwise  GENERAL : denies fever, chills, sweats, weight loss, weight gain  SKIN: denies any unusual skin lesions or rashes  RESPIRATORY: denies shortness of breath with exertion  NEURO: denies headaches    EXAM:   There were no vitals taken for this visit.    System Pertinent findings Details   Constitutional  Overall appearance - Normal.   Psychiatric  Orientation - Oriented to time, place, person & situation. Appropriate mood and affect.   Head/Face  Facial features -- Normal. Skull - Normal.   Eyes  Pupils equal ,round ,react to light and accomidate   Ears  External Ear Right: Normal, Left: Normal. Canal - Right: Normal, Left: Normal. TM - Right: Normal left: Normal   Nose  External Nose, Normal, Septum -midline nasal Vault, clear,Turbinates - Right: Normal left: Normal   Mouth/Throat  Lips/teeth/gums - Normal. Tonsils -1+. Oropharynx - Normal.   Neck Exam  Inspection - Normal. Palpation - Normal. Parotid gland - Normal. Thyroid gland -normal   Neurological  Cranial nerves - Cranial nerves II through XII grossly intact.   Nasopharynx   Normal        Lymph Detail  Submental. Submandibular. Anterior cervical. Posterior cervical. Supraclavicular.   Audiogram shows mild to moderate high-frequency sensorineural hearing loss    ASSESSMENT AND PLAN:   1. Sensorineural hearing loss (SNHL) of both ears  Medically cleared for hearing aids this may help.  With the tinnitus    2. Tinnitus of both ears  As above could get hearing aids with tinnitus maskers.  Additionally, could see her physician  adjust the Lamictal dosing possibly this would improve her tinnitus.    I would definitely recommend a 1 year follow-up audiogram      The patient indicates understanding of these issues and agrees to the plan.      César Arceo MD  11/19/2024  12:29 PM

## 2025-01-14 ENCOUNTER — HOSPITAL ENCOUNTER (OUTPATIENT)
Dept: NUCLEAR MEDICINE | Facility: HOSPITAL | Age: 65
Discharge: HOME | End: 2025-01-14
Attending: FAMILY MEDICINE
Payer: MEDICARE

## 2025-01-14 DIAGNOSIS — K65.1 RIGHT UPPER QUADRANT ABDOMINAL ABSCESS (HCC): ICD-10-CM

## 2025-01-14 PROCEDURE — 78227 HEPATOBIL SYST IMAGE W/DRUG: CPT | Performed by: FAMILY MEDICINE

## 2025-03-26 ENCOUNTER — OFFICE VISIT (OUTPATIENT)
Facility: LOCATION | Age: 65
End: 2025-03-26
Payer: MEDICARE

## 2025-03-26 DIAGNOSIS — E06.9 THYROIDITIS: Primary | ICD-10-CM

## 2025-03-26 PROCEDURE — 1160F RVW MEDS BY RX/DR IN RCRD: CPT | Performed by: OTOLARYNGOLOGY

## 2025-03-26 PROCEDURE — 1159F MED LIST DOCD IN RCRD: CPT | Performed by: OTOLARYNGOLOGY

## 2025-03-26 PROCEDURE — 99214 OFFICE O/P EST MOD 30 MIN: CPT | Performed by: OTOLARYNGOLOGY

## 2025-03-26 NOTE — PROGRESS NOTES
Gary Howe is a 65 year old female. No chief complaint on file.    HPI:   65-year-old female had a history of Graves' disease 30 years ago and was given I-131 radioactive iodine ablation.  Is on 75 mcg of L-thyroxine.  Patient has been having some difficulties regulating the medications.  Had a recent large weight loss.  Comes in thyroid blood tests are normal she has a very mild elevation of thyroid antibodies.  Patient had a thyroid ultrasound that showed absence of left thyroid and enlargement of right thyroid not described as a nodule  Current Outpatient Medications   Medication Sig Dispense Refill    topiramate 50 MG Oral Tab Take 1 tablet (50 mg total) by mouth nightly. TAKE AT BEDTIME      amoxicillin 500 MG Oral Tab Take 1 tablet (500 mg total) by mouth 3 (three) times daily.      clonazePAM 0.5 MG Oral Tab Take 1 tablet (0.5 mg total) by mouth daily. 30 tablet 0    cholecalciferol 1000 UNITS Oral Cap Take 1 capsule (1,000 Units total) by mouth daily.      Venlafaxine HCl ER (EFFEXOR-XR) 150 MG Oral Capsule SR 24 Hr Take 1 capsule (150 mg total) by mouth 2 (two) times daily. (Patient not taking: Reported on 1/6/2024) 60 capsule 0    clonazepam (KLONOPIN) 1 MG Oral Tab Take 1 mg by mouth 3 (three) times daily.        lamoTRIgine (LAMICTAL) 200 MG Oral Tab Take 2 tablets (400 mg total) by mouth daily. Patient takes 2 (200mg) tablets nightly      Donepezil HCl (ARICEPT) 10 MG Oral Tab Take 10 mg by mouth daily.        Vitamin B-12 (VITAMIN B12) 1000 MCG Oral Tab Take 1 tablet (1,000 mcg total) by mouth daily.      Levothyroxine Sodium (SYNTHROID) 100 MCG Oral Tab Take 1 tablet (100 mcg total) by mouth daily.        Past Medical History:    Dementia (HCC)    Depression    Endocrine disorder    enlarged thyroid - removed 1990'    Seizure disorder (HCC)    Seizure disorder, grand mal (HCC)    none for 20 years, then 3 grandmal in 2009    Sleep apnea    Unspecified disorder of thyroid      Social  History:  Social History     Socioeconomic History    Marital status:    Tobacco Use    Smoking status: Former     Current packs/day: 0.00     Average packs/day: 0.8 packs/day for 10.0 years (7.5 ttl pk-yrs)     Types: Cigarettes     Start date: 1995     Quit date: 2005     Years since quittin.2    Smokeless tobacco: Never   Substance and Sexual Activity    Alcohol use: No    Drug use: No      Past Surgical History:   Procedure Laterality Date    Knee arthroscopy      Thyroidectomy           REVIEW OF SYSTEMS:   GENERAL HEALTH: feels well otherwise  GENERAL : denies fever, chills, sweats, weight loss, weight gain  SKIN: denies any unusual skin lesions or rashes  RESPIRATORY: denies shortness of breath with exertion  NEURO: denies headaches    EXAM:   There were no vitals taken for this visit.    System Pertinent findings Details   Constitutional  Overall appearance - Normal.   Head/Face  Facial features -- Normal. Skull - Normal.   Eyes  Pupils equal ,round ,react to light and accomidate   Ears  External Ear Right: Normal, Left: Normal. Canal - Right: Normal, Left: Normal. TM - Right: Normal left: normal   Nose  External Nose, Normal, Septum -midline,Nasal Vault, clear. Turbinates - Right: Normal left: Normal   Mouth/Throat  Lips/teeth/gums - Normal. Tonsils -1+ oropharynx - Normal.   Neck Exam  Inspection - Normal. Palpation - Normal. Parotid gland - Normal. Thyroid gland -normal   Lymph Detail  Submental. Submandibular. Anterior cervical. Posterior cervical. Supraclavicular.   Thyroid ultrasound dated 2025 [facility]  Right thyroid  Whole gland measures 2.3 x 0.7 x 0.7 hypervascularity  Left thyroid  Appears as though she does not have any left thyroid and/or isthmus    ASSESSMENT AND PLAN:   1. 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  With I-131 ablation they do not lose the thyroid and she is not had any surgery on her thyroid.  With that said I would  repeat a thyroid ultrasound in 8 months time to check that the right thyroid is not a enlarging nodule.  Otherwise this seems to be consistent with a very mild thyroiditis picture      The patient indicates understanding of these issues and agrees to the plan.      César Arceo MD  3/26/2025  4:10 PM

## (undated) NOTE — MR AVS SNAPSHOT
After Visit Summary   11/7/2023    Frank Kearns   MRN: [id number]           Visit Information     Date & Time  11/7/2023 12:00 PM Provider  [address] [place] [address] EEG Dept. Phone  [phone]      Allergies as of 11/7/2023  Review status set to Review Complete on 1/15/2020       Noted Reaction Type Reactions    Dilantin [phenytoin Sodium] 09/09/2013    RASH      Your Current Medications        Dosage    cholecalciferol 1000 UNITS Oral Cap Take 1,000 Units by mouth daily. Venlafaxine HCl ER (EFFEXOR-XR) 150 MG Oral Capsule SR 24 Hr Take 1 capsule (150 mg total) by mouth 2 (two) times daily. clonazepam (KLONOPIN) 1 MG Oral Tab Take 1 mg by mouth 3 (three) times daily. lamoTRIgine (LAMICTAL) 200 MG Oral Tab Take 400 mg by mouth daily. Patient takes 2 (200mg) tablets nightly     Donepezil HCl (ARICEPT) 10 MG Oral Tab Take 10 mg by mouth daily. Vitamin B-12 (VITAMIN B12) 1000 MCG Oral Tab Take 1,000 mcg by mouth daily. Levothyroxine Sodium (SYNTHROID) 100 MCG Oral Tab Take 100 mcg by mouth daily. Diagnoses for This Visit    History of seizure   [[id number]]             We Ordered the Following     Normal Orders This Visit    EEG [NEU4 CUSTOM]       Future Appointments        Provider Department    11/18/2023 9:20 AM OLEG DEXA RM1; OLEG XR RM1; OLEG RAFAEL [address]                Did you know that [facility] primary care physicians now offer Video Visits through [address] for adult patients for a variety of conditions such as allergies, back pain and cold symptoms? Skip the drive and waiting room and online chat with a doctor face-to-face using your web-cam enabled computer or mobile device wherever you are. Video Visits cost $50 and can be paid hassle-free using a credit, debit, or health savings card. Not active on [employer]? Ask us how to get signed up today!           If you receive a survey from [facility], please take a few minutes to complete it and provide feedback. We strive to deliver the best patient experience and are looking for ways to make improvements. Your feedback will help us do so. For more information on [employer], please visit [email or web address]           No text in SmartText           No text in SmartText